# Patient Record
Sex: FEMALE | Race: BLACK OR AFRICAN AMERICAN | NOT HISPANIC OR LATINO | Employment: FULL TIME | URBAN - METROPOLITAN AREA
[De-identification: names, ages, dates, MRNs, and addresses within clinical notes are randomized per-mention and may not be internally consistent; named-entity substitution may affect disease eponyms.]

---

## 2023-09-12 ENCOUNTER — HOSPITAL ENCOUNTER (OUTPATIENT)
Dept: DATA CONVERSION | Facility: HOSPITAL | Age: 33
Discharge: HOME | End: 2023-09-12
Payer: COMMERCIAL

## 2023-09-12 DIAGNOSIS — H92.01 OTALGIA, RIGHT EAR: ICD-10-CM

## 2023-09-12 DIAGNOSIS — H61.21 IMPACTED CERUMEN, RIGHT EAR: ICD-10-CM

## 2023-09-12 DIAGNOSIS — H91.91 UNSPECIFIED HEARING LOSS, RIGHT EAR: ICD-10-CM

## 2023-09-15 ENCOUNTER — HOSPITAL ENCOUNTER (OUTPATIENT)
Dept: DATA CONVERSION | Facility: HOSPITAL | Age: 33
Discharge: HOME | End: 2023-09-15
Payer: COMMERCIAL

## 2023-09-15 DIAGNOSIS — H61.21 IMPACTED CERUMEN, RIGHT EAR: ICD-10-CM

## 2023-09-15 DIAGNOSIS — H92.01 OTALGIA, RIGHT EAR: ICD-10-CM

## 2023-12-07 ENCOUNTER — HOSPITAL ENCOUNTER (EMERGENCY)
Facility: HOSPITAL | Age: 33
Discharge: HOME | End: 2023-12-07
Attending: EMERGENCY MEDICINE
Payer: COMMERCIAL

## 2023-12-07 VITALS
OXYGEN SATURATION: 96 % | DIASTOLIC BLOOD PRESSURE: 84 MMHG | HEART RATE: 71 BPM | TEMPERATURE: 97.9 F | SYSTOLIC BLOOD PRESSURE: 125 MMHG | BODY MASS INDEX: 29.25 KG/M2 | HEIGHT: 66 IN | WEIGHT: 182 LBS | RESPIRATION RATE: 16 BRPM

## 2023-12-07 DIAGNOSIS — H10.31 ACUTE CONJUNCTIVITIS OF RIGHT EYE, UNSPECIFIED ACUTE CONJUNCTIVITIS TYPE: Primary | ICD-10-CM

## 2023-12-07 PROCEDURE — 99283 EMERGENCY DEPT VISIT LOW MDM: CPT | Performed by: EMERGENCY MEDICINE

## 2023-12-07 RX ORDER — POLYMYXIN B SULFATE AND TRIMETHOPRIM 1; 10000 MG/ML; [USP'U]/ML
1 SOLUTION OPHTHALMIC EVERY 4 HOURS
Qty: 5 ML | Refills: 0 | Status: SHIPPED | OUTPATIENT
Start: 2023-12-07 | End: 2023-12-17

## 2023-12-07 ASSESSMENT — COLUMBIA-SUICIDE SEVERITY RATING SCALE - C-SSRS
1. IN THE PAST MONTH, HAVE YOU WISHED YOU WERE DEAD OR WISHED YOU COULD GO TO SLEEP AND NOT WAKE UP?: NO
6. HAVE YOU EVER DONE ANYTHING, STARTED TO DO ANYTHING, OR PREPARED TO DO ANYTHING TO END YOUR LIFE?: NO
2. HAVE YOU ACTUALLY HAD ANY THOUGHTS OF KILLING YOURSELF?: NO

## 2023-12-07 NOTE — ED PROVIDER NOTES
HPI   Chief Complaint   Patient presents with    Eye Problem     Pink eye       33-year-old female with no significant medical history presents with 1 day of right eye irritation, redness, and drainage.  No change in vision.  No pain.  No fever or other systemic symptoms.  No definite sick contacts but has been in and out of the hospital over the past couple of days as she has a child who is currently inpatient.                          Tim Coma Scale Score: 15                  Patient History   No past medical history on file.  No past surgical history on file.  No family history on file.  Social History     Tobacco Use    Smoking status: Not on file    Smokeless tobacco: Not on file   Substance Use Topics    Alcohol use: Not on file    Drug use: Not on file       Physical Exam   ED Triage Vitals [12/07/23 0426]   Temp Heart Rate Resp BP   36.6 °C (97.9 °F) 71 16 125/84      SpO2 Temp Source Heart Rate Source Patient Position   96 % Temporal -- --      BP Location FiO2 (%)     -- --       Physical Exam  Constitutional:       Appearance: Normal appearance.   HENT:      Head: Normocephalic and atraumatic.      Comments: Mild conjunctival injection right eye.  Pupils equal round reactive.  Extraocular moods are intact.        ED Course & MDM   Diagnoses as of 12/07/23 0559   Acute conjunctivitis of right eye, unspecified acute conjunctivitis type       Medical Decision Making  Patient with a mild conjunctivitis, likely viral in nature.  After discussion with the patient, will prescribe Polytrim ophthalmic drops.  Will return if symptoms are worsening.        Procedure  Procedures     Cristhian Sherwood MD  12/07/23 0601

## 2024-05-25 ENCOUNTER — APPOINTMENT (OUTPATIENT)
Dept: RADIOLOGY | Facility: HOSPITAL | Age: 34
End: 2024-05-25
Payer: COMMERCIAL

## 2024-05-25 ENCOUNTER — HOSPITAL ENCOUNTER (EMERGENCY)
Facility: HOSPITAL | Age: 34
Discharge: HOME | End: 2024-05-25
Payer: COMMERCIAL

## 2024-05-25 VITALS
RESPIRATION RATE: 18 BRPM | HEIGHT: 66 IN | OXYGEN SATURATION: 99 % | WEIGHT: 181 LBS | DIASTOLIC BLOOD PRESSURE: 87 MMHG | TEMPERATURE: 98.8 F | HEART RATE: 74 BPM | BODY MASS INDEX: 29.09 KG/M2 | SYSTOLIC BLOOD PRESSURE: 123 MMHG

## 2024-05-25 DIAGNOSIS — R51.9 ACUTE NONINTRACTABLE HEADACHE, UNSPECIFIED HEADACHE TYPE: ICD-10-CM

## 2024-05-25 DIAGNOSIS — R05.1 ACUTE COUGH: Primary | ICD-10-CM

## 2024-05-25 DIAGNOSIS — R09.81 NASAL CONGESTION: ICD-10-CM

## 2024-05-25 LAB
ANION GAP SERPL CALC-SCNC: 9 MMOL/L
BASOPHILS # BLD AUTO: 0.06 X10*3/UL (ref 0–0.1)
BASOPHILS NFR BLD AUTO: 0.6 %
BUN SERPL-MCNC: 12 MG/DL (ref 8–25)
CALCIUM SERPL-MCNC: 9.3 MG/DL (ref 8.5–10.4)
CHLORIDE SERPL-SCNC: 103 MMOL/L (ref 97–107)
CO2 SERPL-SCNC: 26 MMOL/L (ref 24–31)
CREAT SERPL-MCNC: 0.8 MG/DL (ref 0.4–1.6)
EGFRCR SERPLBLD CKD-EPI 2021: >90 ML/MIN/1.73M*2
EOSINOPHIL # BLD AUTO: 0.23 X10*3/UL (ref 0–0.7)
EOSINOPHIL NFR BLD AUTO: 2.4 %
ERYTHROCYTE [DISTWIDTH] IN BLOOD BY AUTOMATED COUNT: 12.2 % (ref 11.5–14.5)
GLUCOSE SERPL-MCNC: 83 MG/DL (ref 65–99)
HCT VFR BLD AUTO: 39.2 % (ref 36–46)
HGB BLD-MCNC: 12.5 G/DL (ref 12–16)
IMM GRANULOCYTES # BLD AUTO: 0.02 X10*3/UL (ref 0–0.7)
IMM GRANULOCYTES NFR BLD AUTO: 0.2 % (ref 0–0.9)
LYMPHOCYTES # BLD AUTO: 3.28 X10*3/UL (ref 1.2–4.8)
LYMPHOCYTES NFR BLD AUTO: 34.2 %
MCH RBC QN AUTO: 31.9 PG (ref 26–34)
MCHC RBC AUTO-ENTMCNC: 31.9 G/DL (ref 32–36)
MCV RBC AUTO: 100 FL (ref 80–100)
MONOCYTES # BLD AUTO: 0.65 X10*3/UL (ref 0.1–1)
MONOCYTES NFR BLD AUTO: 6.8 %
NEUTROPHILS # BLD AUTO: 5.35 X10*3/UL (ref 1.2–7.7)
NEUTROPHILS NFR BLD AUTO: 55.8 %
NRBC BLD-RTO: 0 /100 WBCS (ref 0–0)
PLATELET # BLD AUTO: 270 X10*3/UL (ref 150–450)
POTASSIUM SERPL-SCNC: 4.2 MMOL/L (ref 3.4–5.1)
RBC # BLD AUTO: 3.92 X10*6/UL (ref 4–5.2)
SODIUM SERPL-SCNC: 138 MMOL/L (ref 133–145)
WBC # BLD AUTO: 9.6 X10*3/UL (ref 4.4–11.3)

## 2024-05-25 PROCEDURE — 71045 X-RAY EXAM CHEST 1 VIEW: CPT | Performed by: SURGERY

## 2024-05-25 PROCEDURE — 80048 BASIC METABOLIC PNL TOTAL CA: CPT

## 2024-05-25 PROCEDURE — 36415 COLL VENOUS BLD VENIPUNCTURE: CPT

## 2024-05-25 PROCEDURE — 99283 EMERGENCY DEPT VISIT LOW MDM: CPT | Mod: 25

## 2024-05-25 PROCEDURE — 85025 COMPLETE CBC W/AUTO DIFF WBC: CPT

## 2024-05-25 PROCEDURE — 96360 HYDRATION IV INFUSION INIT: CPT

## 2024-05-25 PROCEDURE — 71045 X-RAY EXAM CHEST 1 VIEW: CPT

## 2024-05-25 PROCEDURE — 2500000004 HC RX 250 GENERAL PHARMACY W/ HCPCS (ALT 636 FOR OP/ED)

## 2024-05-25 PROCEDURE — 2500000001 HC RX 250 WO HCPCS SELF ADMINISTERED DRUGS (ALT 637 FOR MEDICARE OP)

## 2024-05-25 RX ORDER — FLUTICASONE PROPIONATE 50 MCG
1 SPRAY, SUSPENSION (ML) NASAL DAILY
Qty: 16 G | Refills: 0 | Status: SHIPPED | OUTPATIENT
Start: 2024-05-25 | End: 2024-06-24

## 2024-05-25 RX ORDER — IBUPROFEN 400 MG/1
400 TABLET ORAL ONCE
Status: COMPLETED | OUTPATIENT
Start: 2024-05-25 | End: 2024-05-25

## 2024-05-25 RX ORDER — BENZONATATE 100 MG/1
100 CAPSULE ORAL EVERY 8 HOURS
Qty: 21 CAPSULE | Refills: 0 | Status: SHIPPED | OUTPATIENT
Start: 2024-05-25 | End: 2024-06-01

## 2024-05-25 RX ORDER — LORATADINE 10 MG/1
10 TABLET ORAL DAILY
Qty: 20 TABLET | Refills: 0 | Status: SHIPPED | OUTPATIENT
Start: 2024-05-25 | End: 2024-06-14

## 2024-05-25 RX ORDER — ACETAMINOPHEN 325 MG/1
975 TABLET ORAL ONCE
Status: COMPLETED | OUTPATIENT
Start: 2024-05-25 | End: 2024-05-25

## 2024-05-25 RX ADMIN — ACETAMINOPHEN 975 MG: 325 TABLET ORAL at 20:30

## 2024-05-25 RX ADMIN — SODIUM CHLORIDE 1000 ML: 900 INJECTION, SOLUTION INTRAVENOUS at 20:38

## 2024-05-25 RX ADMIN — IBUPROFEN 400 MG: 400 TABLET, FILM COATED ORAL at 20:30

## 2024-05-25 ASSESSMENT — COLUMBIA-SUICIDE SEVERITY RATING SCALE - C-SSRS
6. HAVE YOU EVER DONE ANYTHING, STARTED TO DO ANYTHING, OR PREPARED TO DO ANYTHING TO END YOUR LIFE?: NO
1. IN THE PAST MONTH, HAVE YOU WISHED YOU WERE DEAD OR WISHED YOU COULD GO TO SLEEP AND NOT WAKE UP?: NO
2. HAVE YOU ACTUALLY HAD ANY THOUGHTS OF KILLING YOURSELF?: NO

## 2024-05-26 NOTE — DISCHARGE INSTRUCTIONS
Thank you for visiting [facility] emergency department.  It was a pleasure caring for you.    Be sure to take all medications, over the counter medications or prescription medications only as directed.     Be sure to follow up as directed in 1-2 days.  All of the details of your follow up instructions are detailed in the follow up section of this packet.      If you are being discharged with any pains medications or muscle relaxers (norco, Vicodin, hydrocodone products, Percocet, oxycodone products, flexeril, cyclobenzaprine, robaxin, norflex, brand or generic, or any other pain controlling medications with the exception of Ibuprofen and regular Tylenol, do not drive or operate machinery, climb ladders or participate in any activity that could potentially put yourself or others at risk should you get dizzy, or be/feel impaired at all.        It is important to remember that your care does not end here and you must continue to monitor your condition closely. Please return to the emergency department for any worsening or concerning signs or symptoms as directed by our conversations and the discharge instructions. Otherwise please follow up with your doctor in 2 days if no better or worse. If you do not have a doctor please contact the referral number on your discharge instructions. Please contact any physician specialists provided in your discharge notes as it is very important to follow up with them regarding your condition. If you are unable to reach the physicians provided, please come back to the Emergency Department at any time.       As always, please take medications as directed. If you have any questions at all regarding your medications, please contact the pharmacist, the emergency department, or your doctor. Before taking any medication prescribed in the Emergency Department, please review the medication side effects and drug interactions (<http://www.rxlist.com/script/main/hp.asp>) as they may interact with  your home medications.     Having trouble affording medications? Try [employer] <http://[employer]ee[employer]/>! (This is not a hospital endorsed website, merely a recommendation based on my own personal experiences with [employer])      Return to emergency room without delay for ANY new or worsening pains or for any other symptoms or concerns.      Return with worsening pains, nausea, vomiting, trouble breathing, palpitations, shortness of breath, inability to pass stool or urine, loss of control of stool or urine, any numbness or tingling (that is not normal for you), uncontrolled fevers, the passing of blood or other material in stool or urine, rashes, pains or for any other symptoms or concerns you may have.  You are always welcome to return to the ER at any time for any reason or for any other concerns you may have.

## 2024-05-26 NOTE — ED PROVIDER NOTES
HPI   Chief Complaint   Patient presents with    Nausea     Faint headache and nausea, feels super dizzy. Started a week ago, has been off and on. Was SOB yesterday. Denies CP, V/C/D.    Headache    Dizziness       Patient is a 33-year-old female presenting to the emergency department for evaluation of cough, congestion, and near syncope.  Patient states over the past few days she has had cough and congestion and just been feeling tired.  She states he tried taking some allergy medications a few times as well as Tylenol sinus with minimal relief.  She states today she developed a little bit of a headache and took her son for a walk.  She states while she was taking her son for a walk she felt like she was going to pass out but never did.  She denies chest pain, shortness of breath, fever, chills, nausea, vomiting abdominal pain, numbness, tingling, hematuria, dysuria, constipation, diarrhea.                          Tim Coma Scale Score: 15                     Patient History   No past medical history on file.  No past surgical history on file.  No family history on file.  Social History     Tobacco Use    Smoking status: Not on file    Smokeless tobacco: Not on file   Substance Use Topics    Alcohol use: Not on file    Drug use: Not on file       Physical Exam   ED Triage Vitals [05/25/24 1922]   Temperature Heart Rate Respirations BP   37.1 °C (98.8 °F) 74 18 123/87      Pulse Ox Temp Source Heart Rate Source Patient Position   99 % Tympanic -- Sitting      BP Location FiO2 (%)     Left arm --       Physical Exam  Vitals and nursing note reviewed.   Constitutional:       General: She is not in acute distress.     Appearance: She is well-developed. She is not ill-appearing or toxic-appearing.   HENT:      Head: Normocephalic and atraumatic.      Mouth/Throat:      Mouth: Mucous membranes are moist.   Eyes:      Extraocular Movements: Extraocular movements intact.      Pupils: Pupils are equal, round, and  reactive to light.   Cardiovascular:      Rate and Rhythm: Normal rate and regular rhythm.      Heart sounds: Normal heart sounds.   Pulmonary:      Effort: Pulmonary effort is normal.      Breath sounds: Normal breath sounds.   Abdominal:      Palpations: Abdomen is soft.      Tenderness: There is no abdominal tenderness.   Musculoskeletal:         General: Normal range of motion.      Cervical back: Normal range of motion.   Skin:     General: Skin is warm and dry.   Neurological:      Mental Status: She is alert.      GCS: GCS eye subscore is 4. GCS verbal subscore is 5. GCS motor subscore is 6.   Psychiatric:         Mood and Affect: Mood normal.         Behavior: Behavior normal.         ED Course & MDM   ED Course as of 05/25/24 2148   Sat May 25, 2024   1934 Normal sinus rhythm with a rate of 76.  DE interval 158.  QRS duration is 88  Normal EKG [JN]      ED Course User Index  [JN] Deven Horn MD         Diagnoses as of 05/25/24 2148   Acute cough   Nasal congestion   Acute nonintractable headache, unspecified headache type       Medical Decision Making  **Disclaimer parts of this chart have been completed using voice recognition software. Please excuse any errors of transcription.     Evaluated this patient independently and my supervising physician was available for consultation.    HPI: Detailed above.    Exam: A medically appropriate exam performed, outlined above, given the known history and presentation.    History obtained from: Patient    EKG: Reviewed and interpreted by my attending physician    Labs/Diagnostics:  Labs Reviewed   CBC WITH AUTO DIFFERENTIAL - Abnormal       Result Value    WBC 9.6      nRBC 0.0      RBC 3.92 (*)     Hemoglobin 12.5      Hematocrit 39.2            MCH 31.9      MCHC 31.9 (*)     RDW 12.2      Platelets 270      Neutrophils % 55.8      Immature Granulocytes %, Automated 0.2      Lymphocytes % 34.2      Monocytes % 6.8      Eosinophils % 2.4       Basophils % 0.6      Neutrophils Absolute 5.35      Immature Granulocytes Absolute, Automated 0.02      Lymphocytes Absolute 3.28      Monocytes Absolute 0.65      Eosinophils Absolute 0.23      Basophils Absolute 0.06     BASIC METABOLIC PANEL - Normal    Glucose 83      Sodium 138      Potassium 4.2      Chloride 103      Bicarbonate 26      Urea Nitrogen 12      Creatinine 0.80      eGFR >90      Calcium 9.3      Anion Gap 9       XR chest 1 view   Final Result   1.  Lungs are hyperinflated which may be due to exuberant inspiratory   effort, air trapping related to bronchiolitis, or emphysema. No focal   consolidation, pleural effusion, or pneumothorax.                  MACRO:   None        Signed by: Candido Rondon 5/25/2024 9:21 PM   Dictation workstation:   [id number]        EMERGENCY DEPARTMENT COURSE and DIFFERENTIAL DIAGNOSIS/MDM:  Patient is a 33-year-old female presenting to the emergency department for evaluation of cough, congestion, headache.  On physical exam vital signs remarkable for hypertension but otherwise stable patient is in no acute distress.  Lung sounds clear to auscultation bilaterally.  This is not the worst headache of her life.  Pupils equal round and reactive to light.  Patient is not tachycardic with no fever.  Diagnostic labs and chest x-ray ordered.  BMP showed no electrolyte abnormalities.  CBC showed no leukocytosis or anemia.  Chest x-ray showed possible bronchiolitis but no focal consolidation, pleural effusion, or pneumothorax.  Patient feeling better after normal saline, Tylenol, and ibuprofen.  She was given a prescription for Flonase, Tessalon Perles, and Claritin.  She was advised to follow-up with primary care physician outpatient within the next 1 to 2 days.  She will return to the emergency department with any new or worsening symptoms.    The patient presented with a chief complaint of cough, congestion, headache. The differential diagnosis associated with this patient's  "presentation includes allergies, viral illness, bronchitis, headache/migraine.     Vitals:    Vitals:    05/25/24 1922   BP: 123/87   BP Location: Left arm   Patient Position: Sitting   Pulse: 74   Resp: 18   Temp: 37.1 °C (98.8 °F)   TempSrc: Tympanic   SpO2: 99%   Weight: 82.1 kg (181 lb)   Height: 1.676 m (5' 6\")     History Limited by:    None    Independent history obtained from:    None    External records reviewed:    None    Diagnostics interpreted by me:    Xrays - see my independent interpretation in MDM    Discussions with other clinicians:    None    Chronic conditions impacting care:    None    Social determinants of health affecting care:    None    Diagnostic tests considered but not performed: None    ED Medications managed:    Medications   sodium chloride 0.9 % bolus 1,000 mL (1,000 mL intravenous New Bag 5/25/24 2038)   acetaminophen (Tylenol) tablet 975 mg (975 mg oral Given 5/25/24 2030)   ibuprofen tablet 400 mg (400 mg oral Given 5/25/24 2030)       Prescription drugs considered:     Ronald Urrutia Claritin    Screenings:              Procedure  Procedures     Zohreh Lee PA-C  05/25/24 2148    "

## 2024-06-27 ENCOUNTER — OFFICE VISIT (OUTPATIENT)
Dept: UROLOGY | Facility: HOSPITAL | Age: 34
End: 2024-06-27
Payer: COMMERCIAL

## 2024-06-27 DIAGNOSIS — R39.9 LOWER URINARY TRACT SYMPTOMS (LUTS): Primary | ICD-10-CM

## 2024-06-27 LAB
POC APPEARANCE, URINE: CLEAR
POC BILIRUBIN, URINE: NEGATIVE
POC BLOOD, URINE: ABNORMAL
POC COLOR, URINE: YELLOW
POC GLUCOSE, URINE: NEGATIVE MG/DL
POC KETONES, URINE: NEGATIVE MG/DL
POC LEUKOCYTES, URINE: NEGATIVE
POC NITRITE,URINE: NEGATIVE
POC PH, URINE: 5.5 PH
POC PROTEIN, URINE: NEGATIVE MG/DL
POC SPECIFIC GRAVITY, URINE: 1.02
POC UROBILINOGEN, URINE: 0.2 EU/DL

## 2024-06-27 PROCEDURE — 81003 URINALYSIS AUTO W/O SCOPE: CPT | Mod: QW | Performed by: STUDENT IN AN ORGANIZED HEALTH CARE EDUCATION/TRAINING PROGRAM

## 2024-06-27 PROCEDURE — 99214 OFFICE O/P EST MOD 30 MIN: CPT | Performed by: STUDENT IN AN ORGANIZED HEALTH CARE EDUCATION/TRAINING PROGRAM

## 2024-06-27 PROCEDURE — 99204 OFFICE O/P NEW MOD 45 MIN: CPT | Performed by: STUDENT IN AN ORGANIZED HEALTH CARE EDUCATION/TRAINING PROGRAM

## 2024-06-27 RX ORDER — OXYBUTYNIN CHLORIDE 5 MG/1
5 TABLET, EXTENDED RELEASE ORAL DAILY
Qty: 30 TABLET | Refills: 3 | Status: SHIPPED | OUTPATIENT
Start: 2024-06-27 | End: 2024-10-25

## 2024-06-27 ASSESSMENT — PAIN SCALES - GENERAL: PAINLEVEL: 0-NO PAIN

## 2024-06-27 NOTE — PROGRESS NOTES
Subjective   Patient ID: Columba Hron is a 33 y.o. female    HPI  33 y.o. female who presents with complaints of frequent urination. She reports that this is not a new issue and has experienced it before. She mentions that during a previous ultrasound, it was noted that her bladder was not emptying completely, and there was a discussion about bladder retention and the potential need for a catheter. The patient also describes an unusual recommendation from a previous doctor to urinate while sitting backward on the toilet seat. She has a dry throat but no other significant symptoms. The last time she urinated was just before arriving at the clinic.    The most recent urinalysis, conducted on 6/27/2024, revealed:  Trace intact blood       Review of Systems    All systems were reviewed. Anything negative was noted in the HPI.    Objective   Physical Exam    General: Well developed, well nourished, alert and cooperative, appears in no acute distress   Eyes: Non-injected conjunctiva, sclera clear, no proptosis   Cardiac: Extremities are warm and well perfused. No edema, cyanosis or pallor   Lungs: Breathing is easy, non-labored. Speaking in clear and complete sentences. Normal diaphragmatic movement   MSK: Ambulatory with steady gait, unassisted   Neuro: Alert and oriented to person, place, and time   Psych: Demonstrates good judgment and reason, without hallucinations, abnormal affect or abnormal behaviors   Skin: No obvious lesions, no rashes       No CVA tenderness bilaterally   No suprapubic pain or discomfort       No past medical history on file.      No past surgical history on file.        Assessment/Plan   Overactive bladder (OAB)    33 y.o. female who presents for the above condition, We had a very long and extensive discussion with the patient regarding the pathophysiology, differential diagnosis, risk factor, management, natural history, incidence and diagnostic work-up of the condition.      Advise the  patient to eliminate caffeine from the diet and switch to decaf coffee, herbal tea, or decaf beverages. Avoid energy drinks.    Plan:  - Oxybutynin 5 mg/day  - Cystoscopy  - Renal US  - Follow up in 1 month        6/27/2024    Scribe Attestation  By signing my name below, ITana Scribe   attest that this documentation has been prepared under the direction and in the presence of Dr. Golden Curiel

## 2024-07-22 ENCOUNTER — APPOINTMENT (OUTPATIENT)
Dept: RADIOLOGY | Facility: HOSPITAL | Age: 34
End: 2024-07-22
Payer: COMMERCIAL

## 2024-07-26 ENCOUNTER — APPOINTMENT (OUTPATIENT)
Dept: RADIOLOGY | Facility: HOSPITAL | Age: 34
End: 2024-07-26
Payer: COMMERCIAL

## 2024-07-26 ENCOUNTER — HOSPITAL ENCOUNTER (EMERGENCY)
Facility: HOSPITAL | Age: 34
Discharge: HOME | End: 2024-07-26
Payer: COMMERCIAL

## 2024-07-26 VITALS
TEMPERATURE: 98.2 F | RESPIRATION RATE: 18 BRPM | HEIGHT: 66 IN | SYSTOLIC BLOOD PRESSURE: 116 MMHG | BODY MASS INDEX: 29.73 KG/M2 | DIASTOLIC BLOOD PRESSURE: 81 MMHG | WEIGHT: 185 LBS | HEART RATE: 77 BPM | OXYGEN SATURATION: 100 %

## 2024-07-26 DIAGNOSIS — R10.30 LOWER ABDOMINAL PAIN: Primary | ICD-10-CM

## 2024-07-26 LAB
ALBUMIN SERPL-MCNC: 4.3 G/DL (ref 3.5–5)
ALP BLD-CCNC: 104 U/L (ref 35–125)
ALT SERPL-CCNC: 9 U/L (ref 5–40)
ANION GAP SERPL CALC-SCNC: 8 MMOL/L
APPEARANCE UR: CLEAR
AST SERPL-CCNC: 13 U/L (ref 5–40)
BASOPHILS # BLD AUTO: 0.06 X10*3/UL (ref 0–0.1)
BASOPHILS NFR BLD AUTO: 0.5 %
BILIRUB SERPL-MCNC: 0.3 MG/DL (ref 0.1–1.2)
BILIRUB UR STRIP.AUTO-MCNC: NEGATIVE MG/DL
BUN SERPL-MCNC: 13 MG/DL (ref 8–25)
CALCIUM SERPL-MCNC: 9.5 MG/DL (ref 8.5–10.4)
CHLORIDE SERPL-SCNC: 99 MMOL/L (ref 97–107)
CO2 SERPL-SCNC: 28 MMOL/L (ref 24–31)
COLOR UR: NORMAL
CREAT SERPL-MCNC: 0.8 MG/DL (ref 0.4–1.6)
EGFRCR SERPLBLD CKD-EPI 2021: >90 ML/MIN/1.73M*2
EOSINOPHIL # BLD AUTO: 0.15 X10*3/UL (ref 0–0.7)
EOSINOPHIL NFR BLD AUTO: 1.4 %
ERYTHROCYTE [DISTWIDTH] IN BLOOD BY AUTOMATED COUNT: 12.1 % (ref 11.5–14.5)
GLUCOSE SERPL-MCNC: 95 MG/DL (ref 65–99)
GLUCOSE UR STRIP.AUTO-MCNC: NORMAL MG/DL
HCG SERPL-ACNC: <1 MIU/ML
HCT VFR BLD AUTO: 42.2 % (ref 36–46)
HGB BLD-MCNC: 13.7 G/DL (ref 12–16)
IMM GRANULOCYTES # BLD AUTO: 0.02 X10*3/UL (ref 0–0.7)
IMM GRANULOCYTES NFR BLD AUTO: 0.2 % (ref 0–0.9)
KETONES UR STRIP.AUTO-MCNC: NEGATIVE MG/DL
LEUKOCYTE ESTERASE UR QL STRIP.AUTO: NEGATIVE
LIPASE SERPL-CCNC: 21 U/L (ref 16–63)
LYMPHOCYTES # BLD AUTO: 3.16 X10*3/UL (ref 1.2–4.8)
LYMPHOCYTES NFR BLD AUTO: 28.5 %
MAGNESIUM SERPL-MCNC: 2 MG/DL (ref 1.6–3.1)
MCH RBC QN AUTO: 31.7 PG (ref 26–34)
MCHC RBC AUTO-ENTMCNC: 32.5 G/DL (ref 32–36)
MCV RBC AUTO: 98 FL (ref 80–100)
MONOCYTES # BLD AUTO: 0.72 X10*3/UL (ref 0.1–1)
MONOCYTES NFR BLD AUTO: 6.5 %
NEUTROPHILS # BLD AUTO: 6.96 X10*3/UL (ref 1.2–7.7)
NEUTROPHILS NFR BLD AUTO: 62.9 %
NITRITE UR QL STRIP.AUTO: NEGATIVE
NRBC BLD-RTO: 0 /100 WBCS (ref 0–0)
PH UR STRIP.AUTO: 6 [PH]
PLATELET # BLD AUTO: 312 X10*3/UL (ref 150–450)
POTASSIUM SERPL-SCNC: 4 MMOL/L (ref 3.4–5.1)
PROT SERPL-MCNC: 7.4 G/DL (ref 5.9–7.9)
PROT UR STRIP.AUTO-MCNC: NEGATIVE MG/DL
RBC # BLD AUTO: 4.32 X10*6/UL (ref 4–5.2)
RBC # UR STRIP.AUTO: NEGATIVE /UL
SODIUM SERPL-SCNC: 135 MMOL/L (ref 133–145)
SP GR UR STRIP.AUTO: 1.02
UROBILINOGEN UR STRIP.AUTO-MCNC: NORMAL MG/DL
WBC # BLD AUTO: 11.1 X10*3/UL (ref 4.4–11.3)

## 2024-07-26 PROCEDURE — 96360 HYDRATION IV INFUSION INIT: CPT

## 2024-07-26 PROCEDURE — 81003 URINALYSIS AUTO W/O SCOPE: CPT

## 2024-07-26 PROCEDURE — 84702 CHORIONIC GONADOTROPIN TEST: CPT

## 2024-07-26 PROCEDURE — 83735 ASSAY OF MAGNESIUM: CPT

## 2024-07-26 PROCEDURE — 83690 ASSAY OF LIPASE: CPT

## 2024-07-26 PROCEDURE — 2500000004 HC RX 250 GENERAL PHARMACY W/ HCPCS (ALT 636 FOR OP/ED)

## 2024-07-26 PROCEDURE — 85025 COMPLETE CBC W/AUTO DIFF WBC: CPT

## 2024-07-26 PROCEDURE — 84075 ASSAY ALKALINE PHOSPHATASE: CPT

## 2024-07-26 PROCEDURE — 74177 CT ABD & PELVIS W/CONTRAST: CPT

## 2024-07-26 PROCEDURE — 99284 EMERGENCY DEPT VISIT MOD MDM: CPT | Mod: 25

## 2024-07-26 PROCEDURE — 36415 COLL VENOUS BLD VENIPUNCTURE: CPT

## 2024-07-26 PROCEDURE — 2550000001 HC RX 255 CONTRASTS

## 2024-07-26 PROCEDURE — 74177 CT ABD & PELVIS W/CONTRAST: CPT | Performed by: RADIOLOGY

## 2024-07-26 RX ORDER — DOCUSATE SODIUM 100 MG/1
100 CAPSULE, LIQUID FILLED ORAL EVERY 12 HOURS
Qty: 60 CAPSULE | Refills: 0 | Status: SHIPPED | OUTPATIENT
Start: 2024-07-26 | End: 2024-08-25

## 2024-07-26 RX ORDER — POLYETHYLENE GLYCOL 3350 17 G/17G
17 POWDER, FOR SOLUTION ORAL DAILY
Qty: 51 G | Refills: 0 | Status: SHIPPED | OUTPATIENT
Start: 2024-07-26 | End: 2024-07-29

## 2024-07-26 ASSESSMENT — PAIN SCALES - GENERAL: PAINLEVEL_OUTOF10: 8

## 2024-07-26 ASSESSMENT — LIFESTYLE VARIABLES
EVER FELT BAD OR GUILTY ABOUT YOUR DRINKING: NO
TOTAL SCORE: 0
HAVE YOU EVER FELT YOU SHOULD CUT DOWN ON YOUR DRINKING: NO
EVER HAD A DRINK FIRST THING IN THE MORNING TO STEADY YOUR NERVES TO GET RID OF A HANGOVER: NO
HAVE PEOPLE ANNOYED YOU BY CRITICIZING YOUR DRINKING: NO

## 2024-07-26 ASSESSMENT — PAIN - FUNCTIONAL ASSESSMENT: PAIN_FUNCTIONAL_ASSESSMENT: 0-10

## 2024-07-26 NOTE — DISCHARGE INSTRUCTIONS
Thank you for visiting [facility] emergency department.  It was a pleasure caring for you.    Be sure to take all medications, over the counter medications or prescription medications only as directed.     Be sure to follow up as directed in 1-2 days.  All of the details of your follow up instructions are detailed in the follow up section of this packet.    If you are being discharged with any pains medications or muscle relaxers (norco, Vicodin, hydrocodone products, Percocet, oxycodone products, flexeril, cyclobenzaprine, robaxin, norflex, brand or generic, or any other pain controlling medications with the exception of Ibuprofen and regular Tylenol, do not drive or operate machinery, climb ladders or participate in any activity that could potentially put yourself or others at risk should you get dizzy, or be/feel impaired at all.        It is important to remember that your care does not end here and you must continue to monitor your condition closely. Please return to the emergency department for any worsening or concerning signs or symptoms as directed by our conversations and the discharge instructions. Otherwise please follow up with your doctor in 2 days if no better or worse. If you do not have a doctor please contact the referral number on your discharge instructions. Please contact any physician specialists provided in your discharge notes as it is very important to follow up with them regarding your condition. If you are unable to reach the physicians provided, please come back to the Emergency Department at any time.     As always, please take medications as directed. If you have any questions at all regarding your medications, please contact the pharmacist, the emergency department, or your doctor. Before taking any medication prescribed in the Emergency Department, please review the medication side effects and drug interactions (<http://www.rxlist.com/script/main/hp.asp>) as they may interact with your  home medications.     Having trouble affording medications? Try [employer] [facility] <http://[employer]xcel/>! (This is not a hospital endorsed website, merely a recommendation based on my own personal experiences with [employer])      Return to emergency room without delay for ANY new or worsening pains or for any other symptoms or concerns.      Return with worsening pains, nausea, vomiting, trouble breathing, palpitations, shortness of breath, inability to pass stool or urine, loss of control of stool or urine, any numbness or tingling (that is not normal for you), uncontrolled fevers, the passing of blood or other material in stool or urine, rashes, pains or for any other symptoms or concerns you may have.  You are always welcome to return to the ER at any time for any reason or for any other concerns you may have.

## 2024-07-26 NOTE — Clinical Note
Columba Horn was seen and treated in our emergency department on 7/26/2024.  She may return to work on 07/29/2024.       If you have any questions or concerns, please don't hesitate to call.      Zohreh Lee PA-C

## 2024-07-26 NOTE — ED PROVIDER NOTES
HPI   Chief Complaint   Patient presents with    Abdominal Pain       Patient is a 33-year-old female presenting to the emergency department for evaluation of urinary frequency and lower abdominal pain.  Patient states she has a history of urinary frequency and she has seen urology for it.  She states she was diagnosed with stress incontinence and given pelvic floor exercises to do at home which helped with the symptoms.  She states she stopped doing the exercises a few months ago and in the the urinary frequency came back.  She states she feels like she has to urinate every 15 minutes.  Nothing makes his symptoms better or worse.  She states that they keep putting her on antibiotics again that she has a UTI.  She states today she was at work and started getting some lower abdominal pain and cramping.  She states nothing made it better or worse and therefore she came to the emergency department.  She denies any chest pain, shortness of breath, fever, chills, nausea, vomiting, recent travel, sick contacts, cough, congestion, headaches, numbness, tingling, hematuria, dysuria, constipation, diarrhea.              Patient History   No past medical history on file.  No past surgical history on file.  No family history on file.  Social History     Tobacco Use    Smoking status: Never    Smokeless tobacco: Never   Substance Use Topics    Alcohol use: Never    Drug use: Never       Physical Exam   ED Triage Vitals [07/26/24 1226]   Temperature Heart Rate Respirations BP   36.8 °C (98.2 °F) 77 18 116/81      Pulse Ox Temp src Heart Rate Source Patient Position   100 % -- Monitor --      BP Location FiO2 (%)     -- --       Physical Exam  Vitals and nursing note reviewed.   Constitutional:       General: She is not in acute distress.     Appearance: She is well-developed. She is not ill-appearing or toxic-appearing.   HENT:      Head: Normocephalic and atraumatic.      Mouth/Throat:      Mouth: Mucous membranes are moist.    Eyes:      Extraocular Movements: Extraocular movements intact.   Cardiovascular:      Rate and Rhythm: Normal rate and regular rhythm.      Heart sounds: Normal heart sounds. No murmur heard.     No friction rub. No gallop.   Pulmonary:      Effort: Pulmonary effort is normal.      Breath sounds: Normal breath sounds. No wheezing, rhonchi or rales.   Abdominal:      Palpations: Abdomen is soft.      Tenderness: There is abdominal tenderness in the periumbilical area. There is no right CVA tenderness, left CVA tenderness, guarding or rebound.   Skin:     General: Skin is warm and dry.   Neurological:      General: No focal deficit present.      Mental Status: She is alert and oriented to person, place, and time.   Psychiatric:         Mood and Affect: Mood normal.         Behavior: Behavior normal.           ED Course & MDM   Diagnoses as of 07/26/24 1646   Lower abdominal pain                       No data recorded                      Medical Decision Making  **Disclaimer parts of this chart have been completed using voice recognition software. Please excuse any errors of transcription.     Evaluated this patient independently and my supervising physician was available for consultation.    HPI: Detailed above.    Exam: A medically appropriate exam performed, outlined above, given the known history and presentation.    History obtained from: Patient    Labs/Diagnostics:  Labs Reviewed   COMPREHENSIVE METABOLIC PANEL - Normal       Result Value    Glucose 95      Sodium 135      Potassium 4.0      Chloride 99      Bicarbonate 28      Urea Nitrogen 13      Creatinine 0.80      eGFR >90      Calcium 9.5      Albumin 4.3      Alkaline Phosphatase 104      Total Protein 7.4      AST 13      Bilirubin, Total 0.3      ALT 9      Anion Gap 8     MAGNESIUM - Normal    Magnesium 2.00     LIPASE - Normal    Lipase 21     URINALYSIS WITH REFLEX CULTURE AND MICROSCOPIC - Normal    Color, Urine Light-Yellow      Appearance,  Urine Clear      Specific Gravity, Urine 1.023      pH, Urine 6.0      Protein, Urine NEGATIVE      Glucose, Urine Normal      Blood, Urine NEGATIVE      Ketones, Urine NEGATIVE      Bilirubin, Urine NEGATIVE      Urobilinogen, Urine Normal      Nitrite, Urine NEGATIVE      Leukocyte Esterase, Urine NEGATIVE     HUMAN CHORIONIC GONADOTROPIN, SERUM QUANTITATIVE    HCG, Beta-Quantitative <1     CBC WITH AUTO DIFFERENTIAL    WBC 11.1      nRBC 0.0      RBC 4.32      Hemoglobin 13.7      Hematocrit 42.2      MCV 98      MCH 31.7      MCHC 32.5      RDW 12.1      Platelets 312      Neutrophils % 62.9      Immature Granulocytes %, Automated 0.2      Lymphocytes % 28.5      Monocytes % 6.5      Eosinophils % 1.4      Basophils % 0.5      Neutrophils Absolute 6.96      Immature Granulocytes Absolute, Automated 0.02      Lymphocytes Absolute 3.16      Monocytes Absolute 0.72      Eosinophils Absolute 0.15      Basophils Absolute 0.06     URINALYSIS WITH REFLEX CULTURE AND MICROSCOPIC    Narrative:     The following orders were created for panel order Urinalysis with Reflex Culture and Microscopic.  Procedure                               Abnormality         Status                     ---------                               -----------         ------                     Urinalysis with Reflex C...[[id number]]  Normal              Final result               Extra Urine Gray Tube[[id number]]                                                         Please view results for these tests on the individual orders.   EXTRA URINE GRAY TUBE     CT abdomen pelvis w IV contrast   Final Result   Contracted gallbladder.        Mild hepatomegaly.        Mild nonspecific free pelvic fluid.        Moderate diffuse retained colonic stool.        There is a tiny appendicolith in the mid appendix, and there are   subtle inflammatory changes in the region of the tip of the appendix,   with soft tissue thickening extending anteriorly and inferiorly  from   the appendiceal tip along the anterolateral right pelvic peritoneal   surface where there is some associated nodular thickening as   described. Mild acute tip appendicitis is suspected. There is no CT   evidence of appendiceal perforation or bowel obstruction at this time.        MACRO:   Jose Pacheco discussed the significance and urgency of this critical   finding by epic secure chat with  GILA ALFORD on 7/26/2024 at 3:22   pm.  (**-RCF-**) Findings:  See findings.        Signed by: Jose Pacheco 7/26/2024 3:22 PM   Dictation workstation:   [id number]        EMERGENCY DEPARTMENT COURSE and DIFFERENTIAL DIAGNOSIS/MDM:  Patient is a 33-year-old female presenting to the emergency department for evaluation of urinary frequency and lower abdominal pain.  On physical exam vital signs stable patient is in no acute distress.  Patient has some periumbilical tenderness to palpation with no rebound or guarding.  No CVA tenderness.  Diagnostic labs and CT of the abdomen ordered.  CBC showed no leukocytosis or anemia.  Urine showed no evidence of infection with no blood therefore low suspicion for nephrolithiasis.  CMP showed no electrolyte normalities.  Magnesium normal.  Lipase normal.  Beta hCG negative.  CT of the abdomen and pelvis showed concern for possible appendicitis therefore general surgery consulted.  Dr. Hood evaluated the patient in the emergency department and has low suspicion for appendicitis at this time as she said that the patient's pain is mainly on the left side not the right.  Return precautions were discussed with the patient.  I did mention that the patient does have a moderate amount of stool in her colon and therefore recommended taking stool softener and MiraLAX.  Patient voiced understanding.  Patient was discharged in stable condition.  She will return to the emergency department with any new or worsening symptoms.    Discussed with patient that more than 50% of abdominal pain that  "comes to the Emergency Department goes undiagnosed and that there were no emergent findings in workup today. Discussed that certain diagnoese such as appendicitis, colitis, diverticulitis, cholelithiasis or other illnesses are undetectable early on in their course and may not be seen on the first visit.  I recommended abdominal re-examination in 12-24 hours if  symptoms are not significantly improved, sooner if worsening.    Vitals:    Vitals:    07/26/24 1226   BP: 116/81   Pulse: 77   Resp: 18   Temp: 36.8 °C (98.2 °F)   SpO2: 100%   Weight: 83.9 kg (185 lb)   Height: 1.676 m (5' 6\")     History Limited by:    None    Independent history obtained from:    None    External records reviewed:    None    Diagnostics interpreted by me:    CT Scan(s) see MDM    Discussions with other clinicians:    General surgeon     Chronic conditions impacting care:    None    Social determinants of health affecting care:    None    Diagnostic tests considered but not performed: None    ED Medications managed:    Medications   sodium chloride 0.9 % bolus 1,000 mL (0 mL intravenous Stopped 7/26/24 1356)   iohexol (OMNIPaque) 350 mg iodine/mL solution 75 mL (75 mL intravenous Given 7/26/24 1442)       Prescription drugs considered:     MiraLAX and Colace    Screenings:              Procedure  Procedures     Zohreh Lee PA-C  07/26/24 1646    "

## 2024-07-26 NOTE — ED TRIAGE NOTES
Pt states that she is having lower left abd pain and states that she is having burning when she urinates. Pt states she's been to urgent care in the past.

## 2024-08-01 ENCOUNTER — APPOINTMENT (OUTPATIENT)
Dept: RADIOLOGY | Facility: HOSPITAL | Age: 34
End: 2024-08-01
Payer: COMMERCIAL

## 2024-09-29 ENCOUNTER — HOSPITAL ENCOUNTER (EMERGENCY)
Facility: HOSPITAL | Age: 34
Discharge: HOME | End: 2024-09-29
Payer: COMMERCIAL

## 2024-09-29 VITALS
DIASTOLIC BLOOD PRESSURE: 76 MMHG | WEIGHT: 185.3 LBS | HEIGHT: 66 IN | SYSTOLIC BLOOD PRESSURE: 123 MMHG | BODY MASS INDEX: 29.78 KG/M2 | TEMPERATURE: 98.8 F | HEART RATE: 73 BPM | RESPIRATION RATE: 20 BRPM | OXYGEN SATURATION: 100 %

## 2024-09-29 DIAGNOSIS — N64.4 BREAST PAIN: Primary | ICD-10-CM

## 2024-09-29 PROCEDURE — 99283 EMERGENCY DEPT VISIT LOW MDM: CPT

## 2024-09-29 RX ORDER — METHAZOLAMIDE 25 MG/1
TABLET ORAL DAILY
COMMUNITY

## 2024-09-29 ASSESSMENT — PAIN DESCRIPTION - PAIN TYPE: TYPE: ACUTE PAIN

## 2024-09-29 ASSESSMENT — PAIN DESCRIPTION - DESCRIPTORS: DESCRIPTORS: ACHING;THROBBING

## 2024-09-29 ASSESSMENT — PAIN - FUNCTIONAL ASSESSMENT: PAIN_FUNCTIONAL_ASSESSMENT: 0-10

## 2024-09-29 ASSESSMENT — LIFESTYLE VARIABLES
EVER FELT BAD OR GUILTY ABOUT YOUR DRINKING: NO
HAVE PEOPLE ANNOYED YOU BY CRITICIZING YOUR DRINKING: NO
EVER HAD A DRINK FIRST THING IN THE MORNING TO STEADY YOUR NERVES TO GET RID OF A HANGOVER: NO
HAVE YOU EVER FELT YOU SHOULD CUT DOWN ON YOUR DRINKING: NO
TOTAL SCORE: 0

## 2024-09-29 ASSESSMENT — PAIN DESCRIPTION - LOCATION: LOCATION: BREAST

## 2024-09-29 ASSESSMENT — COLUMBIA-SUICIDE SEVERITY RATING SCALE - C-SSRS
1. IN THE PAST MONTH, HAVE YOU WISHED YOU WERE DEAD OR WISHED YOU COULD GO TO SLEEP AND NOT WAKE UP?: NO
2. HAVE YOU ACTUALLY HAD ANY THOUGHTS OF KILLING YOURSELF?: NO
6. HAVE YOU EVER DONE ANYTHING, STARTED TO DO ANYTHING, OR PREPARED TO DO ANYTHING TO END YOUR LIFE?: NO

## 2024-09-29 ASSESSMENT — PAIN DESCRIPTION - FREQUENCY: FREQUENCY: INTERMITTENT

## 2024-09-29 ASSESSMENT — PAIN SCALES - GENERAL: PAINLEVEL_OUTOF10: 6

## 2024-09-29 ASSESSMENT — PAIN DESCRIPTION - ORIENTATION: ORIENTATION: RIGHT

## 2024-09-29 NOTE — ED TRIAGE NOTES
Pain in right breast after putting a piercing in the right breast, has not used piercing for  awhile.  Pain is throbbing while lying down or just sitting.

## 2024-09-29 NOTE — DISCHARGE INSTRUCTIONS
Please follow-up closely with primary care provider in the following week.  Continue Tylenol and ibuprofen as needed for aches and pains.  Continue cool compresses and warm compress as needed for pain.  You can use over-the-counter lanolin cream to help with nipple discomfort which can be found over the counter at any local drugstore.

## 2024-09-29 NOTE — ED PROVIDER NOTES
HPI   Chief Complaint   Patient presents with    Breast Pain     Pain in right breast after putting a piercing in the right breast, has not used piercing for  awhile       Patient is a 34-year-old female presents emergency department for evaluation of right breast pain.  Patient states that earlier in the year she had to remove her nipple piercings and insert plastic piercings in preparation for a tummy tuck that she had earlier on in the year.  She states that after the surgery she had to remove her piercings as they were very irritative to her and therefore has not had piercings for several months.  She states that this last Saturday she went and got her nipples repierce.  She states during this they had to repierce her left nipple, but states that the right nipple still had a piercing track and therefore they did not have to repairs, but insert a nipple ring once again.  She states since then she has been having constant dull aching pain.  She states that radiates from her nipple to her entire breast.  She states that she has been taking ibuprofen which does help with the pain, but was concerned as she persist to have pain in her right breast.  She denies any new injury or trauma.  She denies any swelling, rashes, lesions, discharge from the nipple.  She does note that she is currently about to start her menstrual cycle and wonders if this may be contributing.  She denies any fevers, chills lightheadedness, dizziness, nausea, vomiting, or other symptoms at this time.      History provided by:  Patient   used: No            Patient History   No past medical history on file.  No past surgical history on file.  No family history on file.  Social History     Tobacco Use    Smoking status: Never    Smokeless tobacco: Never   Substance Use Topics    Alcohol use: Never    Drug use: Never       Physical Exam   ED Triage Vitals [09/29/24 1546]   Temperature Heart Rate Respirations BP   37.1 °C (98.8  °F) 73 20 123/76      Pulse Ox Temp Source Heart Rate Source Patient Position   100 % Tympanic Monitor Sitting      BP Location FiO2 (%)     Left arm --       Physical Exam  Constitutional:       Appearance: Normal appearance.   Cardiovascular:      Rate and Rhythm: Normal rate and regular rhythm.   Pulmonary:      Effort: Pulmonary effort is normal.      Breath sounds: Normal breath sounds.   Musculoskeletal:         General: Normal range of motion.   Skin:     General: Skin is warm and dry.      Comments: Bilateral nipples with piercings in place.  No surrounding erythema or discharge.  Minimal tenderness palpation over right nipple and breast with no obvious masses or lesions.  No palpable abscess formation.  No discharge with squeezing of nipples bilaterally.  No warmth.   Neurological:      General: No focal deficit present.      Mental Status: She is alert and oriented to person, place, and time.           ED Course & MDM   Diagnoses as of 09/29/24 1659   Breast pain                 No data recorded     [place] Coma Scale Score: 15 (09/29/24 1559 : Isadora Harrison RN)                           Medical Decision Making  Patient is a 34-year-old female presents emergency department for evaluation of pain to right breast after nipple piercing.    Labwork and scans not warranted at today's visit.    Medications not given at today's visit.    I saw this patient independently.  On exam patient bilateral breast and nipples without significant acute abnormality.  Bilateral nipples are pierced with some tenderness palpation of her piercings.  No evidence for purulent discharge, erythema, swelling, or palpable abscess, nodules, or lesions in bilateral breast.  Patient vital signs remained stable.  Suspect patient symptoms today likely related to inflammation due to recent piercings.  She is educated on continued symptom management including topical lanolin cream to help with pain as well as oral Tylenol and ibuprofen.   She is educated on alternating warm and cool compresses to help with symptoms.  She is currently about to start her menstrual cycle and I suspect that this is likely contributing to her breast pain as well.  No indication for workup or imaging at this time.  She is educated on continued symptom management agreeable to plan and discharge at this time.  Emergent pathologies were considered for this patient, although I have low suspicion for anything acutely emergent given patient's clinical presentation, history, physical exam, stable vital signs.  Discharging patient home is reasonable plan of care for outpatient management.    Patient was counseled on clinical impression, expectations, and plan.  Patient was educated to follow-up with PCP in the following 1-2 days.  All questions from patient were answered. They elicited understanding and were agreeable to course of treatment.  Patient was discharged in stable condition and given strict return precautions.    ** Disclaimer:  Parts of this document were written utilizing a voice to text dictation software.  Note may contain minor transcription or typographical errors that were inadvertently transcribed by the computer software.        Procedure  Procedures     Erika Agudelo PA-C  09/29/24 6178

## 2024-10-02 ENCOUNTER — PROCEDURE VISIT (OUTPATIENT)
Dept: UROLOGY | Facility: HOSPITAL | Age: 34
End: 2024-10-02
Payer: COMMERCIAL

## 2024-10-02 DIAGNOSIS — R39.9 LOWER URINARY TRACT SYMPTOMS (LUTS): Primary | ICD-10-CM

## 2024-10-02 PROCEDURE — 52000 CYSTOURETHROSCOPY: CPT | Performed by: STUDENT IN AN ORGANIZED HEALTH CARE EDUCATION/TRAINING PROGRAM

## 2024-10-02 PROCEDURE — 99213 OFFICE O/P EST LOW 20 MIN: CPT | Performed by: STUDENT IN AN ORGANIZED HEALTH CARE EDUCATION/TRAINING PROGRAM

## 2024-10-02 RX ORDER — CIPROFLOXACIN 500 MG/1
500 TABLET ORAL ONCE
Status: SHIPPED | OUTPATIENT
Start: 2024-10-02

## 2024-10-02 RX ORDER — MIRABEGRON 50 MG/1
50 TABLET, FILM COATED, EXTENDED RELEASE ORAL DAILY
Qty: 30 TABLET | Refills: 3 | Status: SHIPPED | OUTPATIENT
Start: 2024-10-02

## 2024-10-02 NOTE — PROGRESS NOTES
Subjective   Patient ID: Columba Horn is a 34 y.o. female    HPI  34 y.o. female who presents for cystoscopic evaluation with complaints of frequent urination. She reports that this is not a new issue and has experienced it before. She mentions that during a previous ultrasound, it was noted that her bladder was not emptying completely, and there was a discussion about bladder retention and the potential need for a catheter. The patient also describes an unusual recommendation from a previous doctor to urinate while sitting backward on the toilet seat. She has a dry throat but no other significant symptoms. The last time she urinated was just before arriving at the clinic.    The most recent urinalysis, conducted on 6/27/2024, revealed:  Trace intact blood       Review of Systems    All systems were reviewed. Anything negative was noted in the HPI.    Objective   Physical Exam    General: Well developed, well nourished, alert and cooperative, appears in no acute distress   Eyes: Non-injected conjunctiva, sclera clear, no proptosis   Cardiac: Extremities are warm and well perfused. No edema, cyanosis or pallor   Lungs: Breathing is easy, non-labored. Speaking in clear and complete sentences. Normal diaphragmatic movement   MSK: Ambulatory with steady gait, unassisted   Neuro: Alert and oriented to person, place, and time   Psych: Demonstrates good judgment and reason, without hallucinations, abnormal affect or abnormal behaviors   Skin: No obvious lesions, no rashes       No CVA tenderness bilaterally   No suprapubic pain or discomfort       No past medical history on file.      No past surgical history on file.    Procedure:  The patient was prepped using a Betadine solution. Lidocaine jelly was instilled into the urethra. The flexible cystoscope was sterilely inserted into the urethra and formal cystoscopy performed in a systematic fashion. For detailed findings of the procedure, please see Dr. Curiel’s remarks  below  Scope A used, Cipro 500 mg p.o. given      Assessment/Plan   Cystoscopic evaluation for Overactive bladder (OAB)    34 y.o. female who presents for the above condition, We had a very long and extensive discussion with the patient regarding the pathophysiology, differential diagnosis, risk factor, management, natural history, incidence and diagnostic work-up of the condition.      Advise the patient to eliminate caffeine from the diet and switch to decaf coffee, herbal tea, or decaf beverages. Avoid energy drinks.    Plan:  - Follow up in 6 months        10/2/2024    Scribe Attestation  By signing my name below, I, Rebecca Gallagher   attest that this documentation has been prepared under the direction and in the presence of Dr. Golden Curiel

## 2024-10-02 NOTE — PROGRESS NOTES
Patient ID: Columba Horn is a 34 y.o. female.    Cystoscopy    Date/Time: 10/2/2024 4:36 PM    Performed by: Golden Curiel MD MPH  Authorized by: Golden Curiel MD MPH    Procedure - Bladder Cystoscopy:     Procedure details: cystoscopy    PROCEDURE NOTE:    PREOPERATIVE DIAGNOSIS:  OAB    POSTOPERATIVE DIAGNOSIS:  Same    OPERATION:  Flexible Cystourethroscopy      SURGEON:  Golden Curiel MD MPH    ANESTHESIA:  2%  lidocaine jelly    COMPLICATIONS:  None    EBL: Minimal    DISPOSITION:  The patient was discharged home after the procedure, per routine.    INDICATIONS: :  Ms. Horn is a 34 y.o. patient with a history of OAB who presents today for Cystoscopy.     The indications, risks and benefits of this procedure were discussed with the patient, consent was obtained prior to the procedure, and to the best of my judgement the patient seemed to understand and agree to the procedure.    PROCEDURE:  The patient  was brought into the procedure suite and informed consent was reviewed and confirmed. Vital signs were obtained prior to the procedure: LMP 09/07/2024 (Approximate) .  The patient was escorted onto the stretcher, placed supine, prepped with betadine and draped in the usual standard surgical fashion.  Intraurethral 2% viscous lidocaine jelly was used for local analgesia.  A 16 [place] flexible cystourethroscope was inserted into the urethra.     Upon entering the bladder the entire bladder was surveyed in a 360 degree fashion.  The left and right ureteral orifices were in normal orthotopic position effluxing clear yellow urine, bilaterally.   There was no evidence of any bladder lesions, foreign objects, stones or evidence of any mucosal changes. The cystoscope was then retroflexed.  The bladder neck was then further examined without any evidence of lesions. The scope was then removed and in an antegrade fashion, the urethra and bladder were again resurveyed with no evidence of additional lesions.   The cystoscope was then fully removed.   The patient tolerated the procedure well.  Vitals were stable after the procedure.  The patient was able to void and was discharged home.  Verbal and written Post procedure instructions were reviewed with the patient.    IMPRESSION:  Normal cysto    PLAN:  Myrbetriq 50mg daily for 6 months

## 2025-01-26 ENCOUNTER — HOSPITAL ENCOUNTER (EMERGENCY)
Facility: HOSPITAL | Age: 35
Discharge: HOME | End: 2025-01-26
Attending: EMERGENCY MEDICINE
Payer: COMMERCIAL

## 2025-01-26 VITALS
WEIGHT: 186 LBS | HEART RATE: 66 BPM | TEMPERATURE: 98.8 F | HEIGHT: 66 IN | BODY MASS INDEX: 29.89 KG/M2 | DIASTOLIC BLOOD PRESSURE: 77 MMHG | OXYGEN SATURATION: 100 % | SYSTOLIC BLOOD PRESSURE: 142 MMHG | RESPIRATION RATE: 20 BRPM

## 2025-01-26 DIAGNOSIS — I10 DIASTOLIC HYPERTENSION: ICD-10-CM

## 2025-01-26 DIAGNOSIS — R51.9 ACUTE NONINTRACTABLE HEADACHE, UNSPECIFIED HEADACHE TYPE: Primary | ICD-10-CM

## 2025-01-26 PROCEDURE — 2500000004 HC RX 250 GENERAL PHARMACY W/ HCPCS (ALT 636 FOR OP/ED): Performed by: EMERGENCY MEDICINE

## 2025-01-26 PROCEDURE — 99284 EMERGENCY DEPT VISIT MOD MDM: CPT | Mod: 25 | Performed by: EMERGENCY MEDICINE

## 2025-01-26 PROCEDURE — 2500000001 HC RX 250 WO HCPCS SELF ADMINISTERED DRUGS (ALT 637 FOR MEDICARE OP): Performed by: EMERGENCY MEDICINE

## 2025-01-26 PROCEDURE — 96374 THER/PROPH/DIAG INJ IV PUSH: CPT

## 2025-01-26 PROCEDURE — 96375 TX/PRO/DX INJ NEW DRUG ADDON: CPT

## 2025-01-26 RX ORDER — KETOROLAC TROMETHAMINE 15 MG/ML
15 INJECTION, SOLUTION INTRAMUSCULAR; INTRAVENOUS ONCE
Status: COMPLETED | OUTPATIENT
Start: 2025-01-26 | End: 2025-01-26

## 2025-01-26 RX ORDER — ONDANSETRON HYDROCHLORIDE 2 MG/ML
4 INJECTION, SOLUTION INTRAVENOUS ONCE
Status: COMPLETED | OUTPATIENT
Start: 2025-01-26 | End: 2025-01-26

## 2025-01-26 RX ORDER — ACETAMINOPHEN 325 MG/1
975 TABLET ORAL ONCE
Status: COMPLETED | OUTPATIENT
Start: 2025-01-26 | End: 2025-01-26

## 2025-01-26 RX ORDER — DEXAMETHASONE SODIUM PHOSPHATE 10 MG/ML
10 INJECTION INTRAMUSCULAR; INTRAVENOUS ONCE
Status: COMPLETED | OUTPATIENT
Start: 2025-01-26 | End: 2025-01-26

## 2025-01-26 RX ADMIN — ACETAMINOPHEN 975 MG: 325 TABLET ORAL at 09:27

## 2025-01-26 RX ADMIN — KETOROLAC TROMETHAMINE 15 MG: 15 INJECTION, SOLUTION INTRAMUSCULAR; INTRAVENOUS at 09:27

## 2025-01-26 RX ADMIN — ONDANSETRON 4 MG: 2 INJECTION INTRAMUSCULAR; INTRAVENOUS at 09:27

## 2025-01-26 RX ADMIN — DEXAMETHASONE SODIUM PHOSPHATE 10 MG: 10 INJECTION INTRAMUSCULAR; INTRAVENOUS at 09:27

## 2025-01-26 ASSESSMENT — PAIN DESCRIPTION - PAIN TYPE: TYPE: ACUTE PAIN

## 2025-01-26 ASSESSMENT — LIFESTYLE VARIABLES
TOTAL SCORE: 0
HAVE PEOPLE ANNOYED YOU BY CRITICIZING YOUR DRINKING: NO
EVER FELT BAD OR GUILTY ABOUT YOUR DRINKING: NO
HAVE YOU EVER FELT YOU SHOULD CUT DOWN ON YOUR DRINKING: NO
EVER HAD A DRINK FIRST THING IN THE MORNING TO STEADY YOUR NERVES TO GET RID OF A HANGOVER: NO

## 2025-01-26 ASSESSMENT — PAIN SCALES - GENERAL: PAINLEVEL_OUTOF10: 10 - WORST POSSIBLE PAIN

## 2025-01-26 ASSESSMENT — PAIN DESCRIPTION - DESCRIPTORS: DESCRIPTORS: SHARP

## 2025-01-26 ASSESSMENT — PAIN DESCRIPTION - LOCATION: LOCATION: HEAD

## 2025-01-26 ASSESSMENT — PAIN - FUNCTIONAL ASSESSMENT: PAIN_FUNCTIONAL_ASSESSMENT: 0-10

## 2025-01-26 NOTE — PROGRESS NOTES
Attestation/Supervisory note for JULIEN Hay      The patient is a 34-year-old female presenting to the emergency department for evaluation of a headache.  The patient states that her headache started 3 days ago.  She states that it is a dull aching pain it is primarily on the right side of the head.  No better or worse.  No radiation.  She has had similar headaches in the past.  Not worst of life.  Gradual in onset.  No recent injury or trauma.  She states that she does have some sensitivity to light but no other visual changes.  No difficulty swallowing or speaking.  No focal weakness or numbness.  No neck or back pain.  No chest pain or shortness of breath.  No abdominal pain.  No nausea vomiting.  No diarrhea or constipation but no urinary complaints.  All pertinent positives and negatives are recorded above.  All other systems reviewed and otherwise negative.  Vital signs with mild diastolic hypertension but otherwise within normal limits.  Physical exam with a well-nourished well-developed female in no acute distress.  HEENT exam within normal limits.  She has no evidence of airway compromise or respiratory distress.  Abdominal exam is benign.  She does not have any gross motor, neurologic or vascular episodes on exam.  Pulses are equal bilaterally.  NIH stroke scale score of 0.      Oral acetaminophen, IV Decadron, IV Toradol and IV Zofran ordered.  Benadryl and Reglan were held as the patient drove herself to the emergency department and does not have a ride home.      No indication for emergent imaging and/or diagnostic testing at this time.      The patient does not have an gross motor, neurologic or vascular deficits on exam.  No visible or palpable bony deformity.  She is hemodynamically stable.  She does report a history of similar headaches.      The patient was released in good condition.  She will follow-up with her primary care physician within 1 to 2 days for further management of her current  symptoms and repeat check of her blood pressure.  She was also given a referral to neurology for further evaluation of her headache.  She will return to the emergency department sooner with worsening of symptoms or onset of new symptoms      Impression/diagnosis:  1.  Headache, right-sided  2.  Diastolic hypertension      I personally saw the patient and made/approve the management plan and take responsibility for the patient management.      I personally discussed the patient's management with the patient      Kellee Trejo MD

## 2025-01-26 NOTE — Clinical Note
Columba Horn was seen and treated in our emergency department on 1/26/2025.  She may return to work on 01/27/2025.       If you have any questions or concerns, please don't hesitate to call.      Kellee Trejo MD

## 2025-01-26 NOTE — DISCHARGE INSTRUCTIONS
Follow-up with your primary care physician within 1 to 2 days for further management of your current symptoms and repeat check of your blood pressure.    Do recommend you take a Benadryl tablet when you get home to help assist with headache resolution.    Follow-up with neurology as scheduled    Return to the emergency department sooner with worsening of symptoms or onset of new symptoms

## 2025-01-26 NOTE — ED PROVIDER NOTES
HPI   Chief Complaint   Patient presents with    Headache     Headache x 2 days, sensitivity to light, no injury, no visual changes       This is a 34-year-old female presenting to the emergency department complaints of headache x 2 days.  Patient states that she typically gets similar headaches but they only lasted a day, this 1 has lasted longer prompting her to come to the emergency department.  The headache today is on the left side of her head, typically the headaches on the right side of her head.  She does have associated light sensitivity and nausea which is consistent with her baseline headaches.  She denies any visual disturbances, weakness or numbness in her upper or lower extremities, changes in her speech.  No fevers or chills.  No neck pain.      Please see HPI for pertinent positive and negative ROS.         Patient History   No past medical history on file.  No past surgical history on file.  No family history on file.  Social History     Tobacco Use    Smoking status: Never    Smokeless tobacco: Never   Substance Use Topics    Alcohol use: Never    Drug use: Never       Physical Exam   ED Triage Vitals [01/26/25 0908]   Temperature Heart Rate Respirations BP   37.1 °C (98.8 °F) 65 19 135/89      Pulse Ox Temp Source Heart Rate Source Patient Position   100 % Temporal Monitor Sitting      BP Location FiO2 (%)     Right arm --       Physical Exam  GENERAL APPEARANCE: Awake and alert. No acute respiratory distress.   VITAL SIGNS: As per the nurses' triage record.  HEENT: Normocephalic, atraumatic. Extraocular muscles are intact. Conjunctiva are pink. Negative scleral icterus. Mucous membranes are moist.   NECK: Soft, nontender and supple, full gross ROM, no meningeal signs.  CHEST: Nontender to palpation. Clear to auscultation bilaterally. Symmetric rise and fall of chest wall.   HEART: Clear S1 and S2. Regular rate and rhythm. Strong and equal pulses in the extremities.  ABDOMEN: Soft, nontender,  nondistended  MUSCULOSKELETAL: The calves are nontender to palpation. Full gross active range of motion. Ambulating on own with no acute difficulties  NEUROLOGICAL: Awake, alert and oriented x 3. Motor power intact in the upper and lower extremities. Sensation is intact to light touch in the upper and lower extremities. Patient answering questions appropriately.  NIH stroke scale 0.  IMMUNOLOGICAL: No lymphatic streaking noted  DERMATOLOGIC: Warm and dry without petechiae, rashes, or ecchymosis noted on visible skin.   PYSCH: Cooperative with appropriate mood and affect.    ED Course & MDM   Diagnoses as of 01/26/25 0957   Acute nonintractable headache, unspecified headache type   Diastolic hypertension                 No data recorded     [place] Coma Scale Score: 15 (01/26/25 0909 : Sarai Barnes RN)                           Medical Decision Making  Parts of this chart have been completed using voice recognition software. Please excuse any errors of transcription.  My thought process and reason for plan has been formulated from the time that I saw the patient until the time of disposition and is not specific to one specific moment during their visit and furthermore my MDM encompasses this entire chart and not only this text box.      HPI: Detailed above.    Exam: A medically appropriate exam performed, outlined above, given the known history and presentation.    History obtained from: Patient    Medications given during visit:  Medications   ondansetron (Zofran) injection 4 mg (4 mg intravenous Given 1/26/25 0927)   dexAMETHasone (Decadron) injection 10 mg (10 mg intravenous Given 1/26/25 0927)   acetaminophen (Tylenol) tablet 975 mg (975 mg oral Given 1/26/25 0927)   ketorolac (Toradol) injection 15 mg (15 mg intravenous Given 1/26/25 0927)        Diagnostic/tests  Labs Reviewed - No data to display   No orders to display        Considerations/further MDM:    Patient presents for headache, differential diagnosis  includes was not limited to migraine headache versus tension headache.  Patient does have a history of similar headaches in the past, therefore do not feel CT imaging is clinically indicated.  She was given IV dexamethasone, IV Toradol, IV Zofran and oral Tylenol with improvement of her headache.  She was given a referral to neurology for follow-up of headaches and given return precautions.  Patient verbalized understanding felt comfortable to plan home.  She was discharged in stable condition.      Procedure  Procedures     Quyen Hay PA-C  01/26/25 1000

## 2025-04-22 ENCOUNTER — APPOINTMENT (OUTPATIENT)
Dept: RADIOLOGY | Facility: HOSPITAL | Age: 35
End: 2025-04-22
Payer: COMMERCIAL

## 2025-04-22 ENCOUNTER — HOSPITAL ENCOUNTER (EMERGENCY)
Facility: HOSPITAL | Age: 35
Discharge: HOME | End: 2025-04-22
Attending: EMERGENCY MEDICINE
Payer: COMMERCIAL

## 2025-04-22 VITALS
HEIGHT: 66 IN | WEIGHT: 183 LBS | TEMPERATURE: 98.1 F | OXYGEN SATURATION: 100 % | DIASTOLIC BLOOD PRESSURE: 89 MMHG | BODY MASS INDEX: 29.41 KG/M2 | SYSTOLIC BLOOD PRESSURE: 129 MMHG | HEART RATE: 66 BPM | RESPIRATION RATE: 18 BRPM

## 2025-04-22 DIAGNOSIS — M79.604 PAIN OF RIGHT LOWER EXTREMITY: Primary | ICD-10-CM

## 2025-04-22 DIAGNOSIS — I10 DIASTOLIC HYPERTENSION: ICD-10-CM

## 2025-04-22 PROCEDURE — 73610 X-RAY EXAM OF ANKLE: CPT | Mod: RT

## 2025-04-22 PROCEDURE — 2500000001 HC RX 250 WO HCPCS SELF ADMINISTERED DRUGS (ALT 637 FOR MEDICARE OP): Performed by: EMERGENCY MEDICINE

## 2025-04-22 PROCEDURE — 73564 X-RAY EXAM KNEE 4 OR MORE: CPT | Mod: RT

## 2025-04-22 PROCEDURE — 99284 EMERGENCY DEPT VISIT MOD MDM: CPT | Mod: 25 | Performed by: EMERGENCY MEDICINE

## 2025-04-22 PROCEDURE — 93971 EXTREMITY STUDY: CPT

## 2025-04-22 PROCEDURE — 73610 X-RAY EXAM OF ANKLE: CPT | Mod: RIGHT SIDE | Performed by: STUDENT IN AN ORGANIZED HEALTH CARE EDUCATION/TRAINING PROGRAM

## 2025-04-22 PROCEDURE — 93971 EXTREMITY STUDY: CPT | Performed by: RADIOLOGY

## 2025-04-22 PROCEDURE — 73564 X-RAY EXAM KNEE 4 OR MORE: CPT | Mod: RIGHT SIDE | Performed by: STUDENT IN AN ORGANIZED HEALTH CARE EDUCATION/TRAINING PROGRAM

## 2025-04-22 RX ORDER — IBUPROFEN 400 MG/1
400 TABLET ORAL ONCE
Status: COMPLETED | OUTPATIENT
Start: 2025-04-22 | End: 2025-04-22

## 2025-04-22 RX ORDER — ACETAMINOPHEN 325 MG/1
975 TABLET ORAL ONCE
Status: COMPLETED | OUTPATIENT
Start: 2025-04-22 | End: 2025-04-22

## 2025-04-22 RX ORDER — IBUPROFEN 800 MG/1
800 TABLET ORAL 3 TIMES DAILY
Qty: 21 TABLET | Refills: 0 | Status: SHIPPED | OUTPATIENT
Start: 2025-04-22 | End: 2025-04-29

## 2025-04-22 RX ORDER — ACETAMINOPHEN 325 MG/1
650 TABLET ORAL EVERY 6 HOURS PRN
Qty: 30 TABLET | Refills: 0 | Status: SHIPPED | OUTPATIENT
Start: 2025-04-22

## 2025-04-22 RX ORDER — METHIMAZOLE 5 MG/1
5 TABLET ORAL
COMMUNITY
Start: 2025-04-03 | End: 2025-07-02

## 2025-04-22 RX ADMIN — IBUPROFEN 400 MG: 400 TABLET, FILM COATED ORAL at 16:21

## 2025-04-22 RX ADMIN — ACETAMINOPHEN 975 MG: 325 TABLET ORAL at 16:21

## 2025-04-22 ASSESSMENT — LIFESTYLE VARIABLES
HAVE YOU EVER FELT YOU SHOULD CUT DOWN ON YOUR DRINKING: NO
HAVE PEOPLE ANNOYED YOU BY CRITICIZING YOUR DRINKING: NO
EVER HAD A DRINK FIRST THING IN THE MORNING TO STEADY YOUR NERVES TO GET RID OF A HANGOVER: NO
TOTAL SCORE: 0
EVER FELT BAD OR GUILTY ABOUT YOUR DRINKING: NO

## 2025-04-22 ASSESSMENT — PAIN SCALES - GENERAL: PAINLEVEL_OUTOF10: 10 - WORST POSSIBLE PAIN

## 2025-04-22 NOTE — PROGRESS NOTES
Attestation/Supervisory note for JULIEN Lee      The patient is a 34-year-old female presenting to the emergency department for evaluation of right lower leg pain and swelling for the past several weeks.  No recent injury or trauma.  She states that the pain is a dull aching pain.  She states that sometimes it is worse when she is moving her leg or when she is walking or standing.  She denies any recent travel or immobility.  No recent surgery.  No headache or visual changes.  No chest pain or shortness of breath.  No abdominal pain.  No nausea or vomiting.  No diarrhea or constipation.  No urinary complaints.  No fever or chills.  No cough or congestion.  She states that she does have a remote history of a blood clot in her right groin and she states that she was on blood thinners for it in the past but she states that it was at least 4 to 5 years ago.  She does not smoke.  No use of oral contraceptives.  All pertinent positives and negatives are recorded above.  All other systems reviewed and otherwise negative.  Vital signs with diastolic hypertension but otherwise within normal limits.  Physical exam with a well-nourished well-developed female in no acute distress.  HEENT exam within normal limits.  She has no evidence of airway compromise or respiratory distress.  Abdominal exam is benign.  She does not have any gross motor, neurologic or vascular deficits on exam.  Pulses are equal bilaterally.  No visible or palpable bony deformity.  There is no erythema, warmth or edema of the right lower extremity compared to the left lower extremity.  Pulses are equal bilaterally.  She is able to walk and stand without assistance.  She is able to converse without difficulty.      Oral ibuprofen and oral acetaminophen ordered      Lower extremity venous duplex right   Final Result   No evidence for deep venous thrombosis within the limits of this   examination.        MACRO:   none        Signed by: Stevie Gamboa 4/22/2025  3:39 PM   Dictation workstation:   [id number]      XR knee right 4+ views   Final Result   No acute osseous abnormality.        Minimal spurring in the patellofemoral compartment.        MACRO:   None.        Signed by: Jose Higginslauar 4/22/2025 3:04 PM   Dictation workstation:   [id number]      XR ankle right 3+ views   Final Result   No acute osseous abnormality.        Small plantar calcaneal spur which can be associated with plantar   fasciitis in the appropriate clinical context.        MACRO:   None.        Signed by: Jose Liangrojelio 4/22/2025 3:05 PM   Dictation workstation:   [id number]           The patient does not have any evidence of airway compromise or respiratory distress on exam.  She does not have any gross motor, neurologic or vascular deficits on exam.  She does not have any warmth, erythema or edema of her right lower extremity compared to her left lower extremity.  She is well-perfused on exam.  She has equal pulses bilaterally.  X-rays of the right knee and right ankle do not show any evidence of acute fracture or dislocation.  Duplex of the right lower extremity shows no evidence of a DVT.      The patient was released in good condition.  She will follow-up with her primary care physician within 1 to 2 days for further management of her current symptoms and repeat check of her blood pressure.  She will return to the emergency department sooner with worsening of symptoms or onset of new symptoms.      Impression/diagnosis:  Right lower leg pain  Diastolic hypertension      I personally saw the patient and made/approve the management plan and take responsibility for the patient management.      I personally discussed the patient's management with the patient      I reviewed the results of the diagnostic imaging.  Formal radiology read was completed by the radiologist.      Kellee Trejo MD

## 2025-04-22 NOTE — DISCHARGE INSTRUCTIONS
Thank you for choosing [facility] and [facility] for your care today. Please follow up as indicated as continued care and treatment is a very important part of your care today. Please return to this or any Emergency Department if you have any new or worsening symptoms.

## 2025-04-22 NOTE — ED PROVIDER NOTES
HPI   Chief Complaint   Patient presents with    Leg Pain     Pt states that she has been having sharp pains in her right knee that radiate down her calf. Pt states that occasionally her foot is swollen       Patient is a 34-year-old female presenting to the emergency department for evaluation of right lower extremity pain. Patient states pain has been going on for a few weeks.  She denies any trauma or injury.  She states the pain is worse when she is moving her leg or when she is standing or walking.  She denies any recent travel.  She denies any chest pain, shortness of breath, fevers, chills, nausea, vomiting, abdominal pain.  She does admit to a history of blood clot many years ago in which she had to be on blood thinners however states she no longer takes them.  She denies any oral contraceptive use.        Patient History   Medical History[1]  Surgical History[2]  Family History[3]  Social History[4]    Physical Exam   ED Triage Vitals [04/22/25 1418]   Temperature Heart Rate Respirations BP   36.7 °C (98.1 °F) 66 18 129/89      Pulse Ox Temp Source Heart Rate Source Patient Position   100 % Temporal Monitor Sitting      BP Location FiO2 (%)     Left arm --       Physical Exam  Vitals and nursing note reviewed.     GENERAL APPEARANCE: This patient is in no acute respiratory distress. Awake and alert. Talking appropriately. Answering questions appropriately. No evidence of pressured speech.    VITAL SIGNS: As per the nurses' triage record.    HEENT: Normocephalic, atraumatic.    NECK:  full gross ROM during exam    MUSCULOSKELETAL:  Full gross active range of motion. Ambulating on own with no acute difficulties.  Full range of motion of the right lower extremity.  No obvious swelling or deformity.  No significant tenderness in the right gastroc.  Mild tenderness to palpation over the right knee and right ankle.     NEUROLOGICAL: Awake, alert and oriented x 3.    IMMUNOLOGICAL: No palpable lymphadenopathy or  lymphatic streaking noted on visible skin.    DERM: No petechiae, rashes, or ecchymoses on visible skin    PSYCH: mood and affect appear normal.        ED Course & MDM   Diagnoses as of 04/22/25 1611   Pain of right lower extremity   Diastolic hypertension                 No data recorded     [place] Coma Scale Score: 15 (04/22/25 1420 : Elina Stone RN)                           Medical Decision Making  **Disclaimer parts of this chart have been completed using voice recognition software. Please excuse any errors of transcription.     Patient seen in conjunction with attending physician .     HPI: Detailed above.    Exam: A medically appropriate exam performed, outlined above, given the known history and presentation.    History obtained from: Patient    Labs/Diagnostics:  Lower extremity venous duplex right   Final Result   No evidence for deep venous thrombosis within the limits of this   examination.        MACRO:   none        Signed by: Stevie Gamboa 4/22/2025 3:39 PM   Dictation workstation:   [id number]      XR knee right 4+ views   Final Result   No acute osseous abnormality.        Minimal spurring in the patellofemoral compartment.        MACRO:   None.        Signed by: Jose Urena 4/22/2025 3:04 PM   Dictation workstation:   [id number]      XR ankle right 3+ views   Final Result   No acute osseous abnormality.        Small plantar calcaneal spur which can be associated with plantar   fasciitis in the appropriate clinical context.        MACRO:   None.        Signed by: Jose Urena 4/22/2025 3:05 PM   Dictation workstation:   [id number]        EMERGENCY DEPARTMENT COURSE and DIFFERENTIAL DIAGNOSIS/MDM:  Patient is a 34-year-old female presenting to the emergency department for evaluation of right lower leg pain.  On physical exam vital signs remarkable for diastolic hypertension but otherwise stable and patient is in no acute distress.  Patient has no signs of neurovascular  "compromise on exam.  She has full range of motion of the right lower extremity.  Strong and equal pulses bilaterally in the lower extremities.  No significant swelling, warmth, erythema of the right lower extremity.  P.o. Tylenol and ibuprofen ordered for pain.  Ultrasound of the right lower extremity showed no evidence of DVT.  X-ray of the right knee showed no acute osseous abnormality.  X-ray of the right ankle showed no acute osseous abnormality with possible plantar fasciitis.  At this time unsure exactly what is causing the patient's symptoms however I feel she can be discharged and follow-up outpatient with primary care physician.  Patient discharged in stable condition.  She will return to the emergency department with any new or worsening symptoms.  Return precautions discussed.    The patient presented with a chief complaint of right lower extremity pain. The differential diagnosis associated with this patient's presentation includes fracture, dislocation, musculoskeletal strain, DVT.     Vitals:    Vitals:    04/22/25 1418   BP: 129/89   BP Location: Left arm   Patient Position: Sitting   Pulse: 66   Resp: 18   Temp: 36.7 °C (98.1 °F)   TempSrc: Temporal   SpO2: 100%   Weight: 83 kg (183 lb)   Height: 1.676 m (5' 6\")     History Limited by:    None    Independent history obtained from:    None    External records reviewed:    None    Diagnostics interpreted by me:    Ultrasound(s) see MDM and Xrays - see my independent interpretation in MDM    Discussions with other clinicians:    None    Chronic conditions impacting care:    None    Social determinants of health affecting care:    None    Diagnostic tests considered but not performed: None    ED Medications managed:    Medications   acetaminophen (Tylenol) tablet 975 mg (has no administration in time range)   ibuprofen tablet 400 mg (has no administration in time range)       Prescription drugs considered:     Tylenol and ibuprofen    Screenings:      "         Procedure  Procedures         [1] No past medical history on file.  [2] No past surgical history on file.  [3] No family history on file.  [4]   Social History  Tobacco Use    Smoking status: Never    Smokeless tobacco: Never   Substance Use Topics    Alcohol use: Never    Drug use: Never        Zohreh Lee PA-C  04/22/25 0625

## 2025-04-22 NOTE — Clinical Note
Columba Horn was seen and treated in our emergency department on 4/22/2025.  She may return to work on 04/23/2025.       If you have any questions or concerns, please don't hesitate to call.      Zohreh Lee PA-C